# Patient Record
Sex: MALE | Race: BLACK OR AFRICAN AMERICAN
[De-identification: names, ages, dates, MRNs, and addresses within clinical notes are randomized per-mention and may not be internally consistent; named-entity substitution may affect disease eponyms.]

---

## 2024-07-17 ENCOUNTER — DASHBOARD ENCOUNTERS (OUTPATIENT)
Age: 74
End: 2024-07-17

## 2024-07-17 ENCOUNTER — OFFICE VISIT (OUTPATIENT)
Dept: URBAN - METROPOLITAN AREA CLINIC 54 | Facility: CLINIC | Age: 74
End: 2024-07-17
Payer: MEDICARE

## 2024-07-17 VITALS
BODY MASS INDEX: 21.79 KG/M2 | DIASTOLIC BLOOD PRESSURE: 80 MMHG | WEIGHT: 138.8 LBS | HEIGHT: 67 IN | SYSTOLIC BLOOD PRESSURE: 168 MMHG | TEMPERATURE: 97.6 F | HEART RATE: 68 BPM

## 2024-07-17 DIAGNOSIS — R06.89 GASPING FOR BREATH: ICD-10-CM

## 2024-07-17 PROCEDURE — 99203 OFFICE O/P NEW LOW 30 MIN: CPT | Performed by: PHYSICIAN ASSISTANT

## 2024-07-17 RX ORDER — ATORVASTATIN CALCIUM 40 MG/1
1 TABLET TABLET, FILM COATED ORAL ONCE A DAY
Status: ACTIVE | COMMUNITY

## 2024-07-17 RX ORDER — METOPROLOL TARTRATE 25 MG/1
1 TABLET WITH FOOD TABLET, FILM COATED ORAL TWICE A DAY
Status: ACTIVE | COMMUNITY

## 2024-07-17 RX ORDER — AMLODIPINE BESYLATE 10 MG/1
1 TABLET TABLET ORAL ONCE A DAY
Status: ACTIVE | COMMUNITY

## 2024-07-17 RX ORDER — ASPIRIN 81 MG/1
1 TABLET TABLET, COATED ORAL ONCE A DAY
Status: ACTIVE | COMMUNITY

## 2024-07-17 NOTE — HPI-TODAY'S VISIT:
Rico Juárez is a 74-year-old male patient who presents as a walk-in patient for heartburn.  Patient reports that last year he has noted gasping episodes that occur at various times.  Most episodes occur while patient is seated at rest on the couch.  Denies that the symptoms are related to hiccups.  He states symptoms come on unprovoked and resolved within a few minutes without intervention.  He sometimes states he passes flatus during these episodes and often times he will have urinary frequency with these episodes.  He was placed on omeprazole last year when symptoms began without alleviation.  He denies feeling epigastric pain, burning to the mid-chest, dysphagia.  No previous EGD.  No SOB with episodes.  No previous abdominal surgeries.  He follows with Dr. Madrid and sees them next week.  He has a pending referral to urology for urinary frequency and trace hematuria.

## 2024-12-09 ENCOUNTER — OFFICE VISIT (OUTPATIENT)
Dept: URBAN - METROPOLITAN AREA CLINIC 54 | Facility: CLINIC | Age: 74
End: 2024-12-09
Payer: COMMERCIAL

## 2024-12-09 VITALS
SYSTOLIC BLOOD PRESSURE: 153 MMHG | WEIGHT: 144.2 LBS | HEART RATE: 75 BPM | TEMPERATURE: 97.6 F | BODY MASS INDEX: 22.63 KG/M2 | DIASTOLIC BLOOD PRESSURE: 64 MMHG | HEIGHT: 67 IN

## 2024-12-09 DIAGNOSIS — R14.3 EXCESSIVE GAS: ICD-10-CM

## 2024-12-09 PROCEDURE — 99213 OFFICE O/P EST LOW 20 MIN: CPT

## 2024-12-09 RX ORDER — ATORVASTATIN CALCIUM 80 MG/1
1 TABLET TABLET, FILM COATED ORAL ONCE A DAY
Status: ACTIVE | COMMUNITY

## 2024-12-09 RX ORDER — ASPIRIN 81 MG/1
1 TABLET TABLET, COATED ORAL ONCE A DAY
Status: ACTIVE | COMMUNITY

## 2024-12-09 RX ORDER — AMLODIPINE BESYLATE 10 MG/1
1 TABLET TABLET ORAL ONCE A DAY
Status: ACTIVE | COMMUNITY

## 2024-12-09 RX ORDER — TAMSULOSIN HYDROCHLORIDE 0.4 MG/1
1 CAPSULE CAPSULE ORAL ONCE A DAY
Status: ACTIVE | COMMUNITY

## 2024-12-09 RX ORDER — METOPROLOL TARTRATE 25 MG/1
1 TABLET WITH FOOD TABLET, FILM COATED ORAL TWICE A DAY
Status: ACTIVE | COMMUNITY

## 2024-12-09 NOTE — HPI-TODAY'S VISIT:
7/17/24: Rico Juárez is a 74-year-old male patient who presents as a walk-in patient for heartburn.  Patient reports that last year he has noted gasping episodes that occur at various times.  Most episodes occur while patient is seated at rest on the couch.  Denies that the symptoms are related to hiccups.  He states symptoms come on unprovoked and resolved within a few minutes without intervention.  He sometimes states he passes flatus during these episodes and often times he will have urinary frequency with these episodes.  He was placed on omeprazole last year when symptoms began without alleviation.  He denies feeling epigastric pain, burning to the mid-chest, dysphagia.  No previous EGD.  No SOB with episodes.  No previous abdominal surgeries.  He follows with Dr. Madrid and sees them next week.  He has a pending referral to urology for urinary frequency and trace hematuria.  12/9/24 Follow Up: Patient returns to clinic complaining of gas. Reports excessive flatulence for the last year. Passing gas every 15 minutes. Feels better but then "fills back up." Has some bloating but no significant distension. No belching. Denies abdominal pain. S/p CCY in Oct 2024 so BMs are more frequent (about 3 times a day) but remain formed ([place] 3). Has not tried GasX. Last colonoscopy was 2013, had stool test this year that was negative.

## 2025-02-21 ENCOUNTER — TELEPHONE ENCOUNTER (OUTPATIENT)
Dept: URBAN - METROPOLITAN AREA CLINIC 54 | Facility: CLINIC | Age: 75
End: 2025-02-21

## 2025-03-04 ENCOUNTER — TELEPHONE ENCOUNTER (OUTPATIENT)
Dept: URBAN - METROPOLITAN AREA CLINIC 54 | Facility: CLINIC | Age: 75
End: 2025-03-04

## 2025-03-04 PROBLEM — 10743008: Status: ACTIVE | Noted: 2025-03-04

## 2025-03-04 RX ORDER — RIFAXIMIN 550 MG/1
1 TABLET TABLET ORAL THREE TIMES A DAY
Qty: 42 TABLET | Refills: 0 | OUTPATIENT
Start: 2025-03-04 | End: 2025-03-18

## 2025-03-05 ENCOUNTER — P2P PATIENT RECORD (OUTPATIENT)
Age: 75
End: 2025-03-05

## 2025-03-05 ENCOUNTER — TELEPHONE ENCOUNTER (OUTPATIENT)
Dept: URBAN - METROPOLITAN AREA CLINIC 54 | Facility: CLINIC | Age: 75
End: 2025-03-05

## 2025-03-06 ENCOUNTER — TELEPHONE ENCOUNTER (OUTPATIENT)
Dept: URBAN - METROPOLITAN AREA CLINIC 54 | Facility: CLINIC | Age: 75
End: 2025-03-06

## 2025-06-18 ENCOUNTER — OFFICE VISIT (OUTPATIENT)
Dept: URBAN - METROPOLITAN AREA CLINIC 54 | Facility: CLINIC | Age: 75
End: 2025-06-18
Payer: COMMERCIAL

## 2025-06-18 DIAGNOSIS — K63.8219 SMALL INTESTINAL BACTERIAL OVERGROWTH (SIBO): ICD-10-CM

## 2025-06-18 DIAGNOSIS — E11.9 TYPE 2 DIABETES MELLITUS WITHOUT COMPLICATION, UNSPECIFIED WHETHER LONG TERM INSULIN USE: ICD-10-CM

## 2025-06-18 DIAGNOSIS — R19.7 DIARRHEA, UNSPECIFIED TYPE: ICD-10-CM

## 2025-06-18 PROBLEM — 44054006: Status: ACTIVE | Noted: 2025-06-18

## 2025-06-18 PROCEDURE — 99214 OFFICE O/P EST MOD 30 MIN: CPT

## 2025-06-18 RX ORDER — ATORVASTATIN CALCIUM 80 MG/1
1 TABLET TABLET, FILM COATED ORAL ONCE A DAY
Status: ACTIVE | COMMUNITY

## 2025-06-18 RX ORDER — EMPAGLIFLOZIN 10 MG/1
1 TABLET TABLET, FILM COATED ORAL ONCE A DAY
Status: ACTIVE | COMMUNITY

## 2025-06-18 RX ORDER — LORATADINE 10 MG
1 TABLET TABLET ORAL ONCE A DAY
Status: ACTIVE | COMMUNITY

## 2025-06-18 RX ORDER — AMLODIPINE BESYLATE 10 MG/1
1 TABLET TABLET ORAL ONCE A DAY
Status: ACTIVE | COMMUNITY

## 2025-06-18 RX ORDER — DIPHENHYDRAMINE HCL 25 MG/1
1 TABLET AT BEDTIME AS NEEDED TABLET ORAL ONCE A DAY
Status: ACTIVE | COMMUNITY

## 2025-06-18 RX ORDER — ASPIRIN 81 MG/1
1 TABLET TABLET, COATED ORAL ONCE A DAY
Status: ACTIVE | COMMUNITY

## 2025-06-18 RX ORDER — TAMSULOSIN HYDROCHLORIDE 0.4 MG/1
1 CAPSULE CAPSULE ORAL ONCE A DAY
Status: ACTIVE | COMMUNITY

## 2025-06-18 RX ORDER — MONTELUKAST 10 MG/1
1 TABLET TABLET, FILM COATED ORAL ONCE A DAY
Status: ACTIVE | COMMUNITY

## 2025-06-18 RX ORDER — METOPROLOL TARTRATE 25 MG/1
1 TABLET WITH FOOD TABLET, FILM COATED ORAL TWICE A DAY
Status: ACTIVE | COMMUNITY

## 2025-06-18 NOTE — HPI-TODAY'S VISIT:
7/17/24: Rico Juárez is a 74-year-old male patient who presents as a walk-in patient for heartburn.  Patient reports that last year he has noted gasping episodes that occur at various times.  Most episodes occur while patient is seated at rest on the couch.  Denies that the symptoms are related to hiccups.  He states symptoms come on unprovoked and resolved within a few minutes without intervention.  He sometimes states he passes flatus during these episodes and often times he will have urinary frequency with these episodes.  He was placed on omeprazole last year when symptoms began without alleviation.  He denies feeling epigastric pain, burning to the mid-chest, dysphagia.  No previous EGD.  No SOB with episodes.  No previous abdominal surgeries.  He follows with Dr. Madrid and sees them next week.  He has a pending referral to urology for urinary frequency and trace hematuria.  12/9/24 Follow Up: Patient returns to clinic complaining of gas. Reports excessive flatulence for the last year. Passing gas every 15 minutes. Feels better but then "fills back up." Has some bloating but no significant distension. No belching. Denies abdominal pain. S/p CCY in Oct 2024 so BMs are more frequent (about 3 times a day) but remain formed ([place] 3). Has not tried GasX. Last colonoscopy was 2013, had stool test this year that was negative.  6/18/25 Follow Up: Patient returns for follow-up complaining of diarrhea since hospitalization in April for newly dx DM with hyperglycemia in the 800s. Having 5-10 foul smelling, fatty stools per day. Pt has had some fecal incontinence when he passes flatus. No hematochezia or melena. Taking Questran QD without relief. Labs earlier this month show elevated liver enzymes with AST 98, , , TB 0.7.  Mildly elevated a month prior, significantly increased from this time last year. Negative HCV Ab. At last visit pt was positive for SIBO (hydrogen only) and was treated with Xifaxan 550 mg 3 times daily x 14 days in Feb 2025 with improvement.

## 2025-07-20 ENCOUNTER — TELEPHONE ENCOUNTER (OUTPATIENT)
Dept: URBAN - METROPOLITAN AREA CLINIC 54 | Facility: CLINIC | Age: 75
End: 2025-07-20

## 2025-07-20 RX ORDER — PANCRELIPASE 36000; 180000; 114000 [USP'U]/1; [USP'U]/1; [USP'U]/1
2 CAPSULES WITH MEALS AND 1 TO 2 CAPSULE WITH SNACKS UP TO 10 CAPSULES PER DAY CAPSULE, DELAYED RELEASE PELLETS ORAL
Qty: 300 | Refills: 11 | OUTPATIENT
Start: 2025-07-20

## 2025-07-22 ENCOUNTER — TELEPHONE ENCOUNTER (OUTPATIENT)
Dept: URBAN - METROPOLITAN AREA CLINIC 54 | Facility: CLINIC | Age: 75
End: 2025-07-22

## 2025-07-30 ENCOUNTER — OFFICE VISIT (OUTPATIENT)
Dept: URBAN - METROPOLITAN AREA CLINIC 54 | Facility: CLINIC | Age: 75
End: 2025-07-30
Payer: COMMERCIAL

## 2025-07-30 ENCOUNTER — LAB OUTSIDE AN ENCOUNTER (OUTPATIENT)
Dept: URBAN - METROPOLITAN AREA CLINIC 54 | Facility: CLINIC | Age: 75
End: 2025-07-30

## 2025-07-30 DIAGNOSIS — K86.81 EXOCRINE PANCREATIC INSUFFICIENCY: ICD-10-CM

## 2025-07-30 DIAGNOSIS — K63.8219 SMALL INTESTINAL BACTERIAL OVERGROWTH (SIBO): ICD-10-CM

## 2025-07-30 DIAGNOSIS — E11.9 TYPE 2 DIABETES MELLITUS WITHOUT COMPLICATION, UNSPECIFIED WHETHER LONG TERM INSULIN USE: ICD-10-CM

## 2025-07-30 DIAGNOSIS — R19.7 DIARRHEA, UNSPECIFIED TYPE: ICD-10-CM

## 2025-07-30 PROBLEM — 47367009: Status: ACTIVE | Noted: 2025-07-20

## 2025-07-30 PROCEDURE — 99214 OFFICE O/P EST MOD 30 MIN: CPT

## 2025-07-30 RX ORDER — MONTELUKAST 10 MG/1
1 TABLET TABLET, FILM COATED ORAL ONCE A DAY
Status: ACTIVE | COMMUNITY

## 2025-07-30 RX ORDER — AMLODIPINE BESYLATE 10 MG/1
1 TABLET TABLET ORAL ONCE A DAY
Status: ACTIVE | COMMUNITY

## 2025-07-30 RX ORDER — PANCRELIPASE 36000; 180000; 114000 [USP'U]/1; [USP'U]/1; [USP'U]/1
2 CAPSULES WITH MEALS AND 1 TO 2 CAPSULE WITH SNACKS UP TO 10 CAPSULES PER DAY CAPSULE, DELAYED RELEASE PELLETS ORAL
OUTPATIENT
Start: 2025-07-20

## 2025-07-30 RX ORDER — PANCRELIPASE 36000; 180000; 114000 [USP'U]/1; [USP'U]/1; [USP'U]/1
2 CAPSULES WITH MEALS AND 1 TO 2 CAPSULE WITH SNACKS UP TO 10 CAPSULES PER DAY CAPSULE, DELAYED RELEASE PELLETS ORAL
Qty: 300 | Refills: 11 | Status: ACTIVE | COMMUNITY
Start: 2025-07-20

## 2025-07-30 RX ORDER — ASPIRIN 81 MG/1
1 TABLET TABLET, COATED ORAL ONCE A DAY
Status: ACTIVE | COMMUNITY

## 2025-07-30 RX ORDER — METOPROLOL TARTRATE 25 MG/1
1 TABLET WITH FOOD TABLET, FILM COATED ORAL TWICE A DAY
Status: ACTIVE | COMMUNITY

## 2025-07-30 RX ORDER — TAMSULOSIN HYDROCHLORIDE 0.4 MG/1
1 CAPSULE CAPSULE ORAL ONCE A DAY
Status: ACTIVE | COMMUNITY

## 2025-07-30 RX ORDER — ATORVASTATIN CALCIUM 80 MG/1
1 TABLET TABLET, FILM COATED ORAL ONCE A DAY
Status: ACTIVE | COMMUNITY

## 2025-07-30 RX ORDER — DIPHENHYDRAMINE HCL 25 MG/1
1 TABLET AT BEDTIME AS NEEDED TABLET ORAL ONCE A DAY
Status: ACTIVE | COMMUNITY

## 2025-07-30 RX ORDER — LORATADINE 10 MG
1 TABLET TABLET ORAL ONCE A DAY
Status: ACTIVE | COMMUNITY

## 2025-07-30 RX ORDER — EMPAGLIFLOZIN 10 MG/1
1 TABLET TABLET, FILM COATED ORAL ONCE A DAY
Status: ACTIVE | COMMUNITY

## 2025-07-30 NOTE — HPI-TODAY'S VISIT:
7/17/24: Rico Juárez is a 74-year-old male patient who presents as a walk-in patient for heartburn.  Patient reports that last year he has noted gasping episodes that occur at various times.  Most episodes occur while patient is seated at rest on the couch.  Denies that the symptoms are related to hiccups.  He states symptoms come on unprovoked and resolved within a few minutes without intervention.  He sometimes states he passes flatus during these episodes and often times he will have urinary frequency with these episodes.  He was placed on omeprazole last year when symptoms began without alleviation.  He denies feeling epigastric pain, burning to the mid-chest, dysphagia.  No previous EGD.  No SOB with episodes.  No previous abdominal surgeries.  He follows with Dr. Madrid and sees them next week.  He has a pending referral to urology for urinary frequency and trace hematuria.  12/9/24 Follow Up: Patient returns to clinic complaining of gas. Reports excessive flatulence for the last year. Passing gas every 15 minutes. Feels better but then "fills back up." Has some bloating but no significant distension. No belching. Denies abdominal pain. S/p CCY in Oct 2024 so BMs are more frequent (about 3 times a day) but remain formed ([place] 3). Has not tried GasX. Last colonoscopy was 2013, had stool test this year that was negative.  6/18/25 Follow Up: Patient returns for follow-up complaining of diarrhea since hospitalization in April for newly dx DM with hyperglycemia in the 800s. Having 5-10 foul smelling, fatty stools per day. Pt has had some fecal incontinence when he passes flatus. No hematochezia or melena. Taking Questran QD without relief. Labs earlier this month show elevated liver enzymes with AST 98, , , TB 0.7.  Mildly elevated a month prior, significantly increased from this time last year. Negative HCV Ab. At last visit pt was positive for SIBO (hydrogen only) and was treated with Xifaxan 550 mg 3 times daily x 14 days in Feb 2025 with improvement.  7/30/25 Follow Up: Patient RTC for follow up of diarrhea. 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 Negative infectious stool studies and normal calprotectin. Started Creon 36k units, 2 with meals 1 with snacks about 5 days ago and has noticed some improvement in diarrhea. Abd US in May 2025 shows foci of increased echogenicity within partially imaged pancreatic parenchyma very likely consistent with sequelae of chronic pancreatitis. No ductal dilation. Normal appearing liver. Pt denies any hx of acute pancreatitis. No hx of alcohol use. Current smoker for almost 50 years, 1/2 to 1 ppd.